# Patient Record
Sex: FEMALE | Race: OTHER
[De-identification: names, ages, dates, MRNs, and addresses within clinical notes are randomized per-mention and may not be internally consistent; named-entity substitution may affect disease eponyms.]

---

## 2018-01-25 ENCOUNTER — HOSPITAL ENCOUNTER (EMERGENCY)
Dept: HOSPITAL 8 - ED | Age: 18
Discharge: HOME | End: 2018-01-25
Payer: MEDICAID

## 2018-01-25 VITALS — SYSTOLIC BLOOD PRESSURE: 130 MMHG | DIASTOLIC BLOOD PRESSURE: 76 MMHG

## 2018-01-25 VITALS — BODY MASS INDEX: 42.24 KG/M2 | HEIGHT: 65 IN | WEIGHT: 253.53 LBS

## 2018-01-25 DIAGNOSIS — J02.9: Primary | ICD-10-CM

## 2018-01-25 DIAGNOSIS — H65.02: ICD-10-CM

## 2018-01-25 PROCEDURE — 99283 EMERGENCY DEPT VISIT LOW MDM: CPT

## 2018-07-04 ENCOUNTER — HOSPITAL ENCOUNTER (EMERGENCY)
Dept: HOSPITAL 8 - ED | Age: 18
LOS: 1 days | Discharge: HOME | End: 2018-07-05
Payer: SELF-PAY

## 2018-07-04 VITALS — HEIGHT: 65 IN | BODY MASS INDEX: 45.69 KG/M2 | WEIGHT: 274.26 LBS

## 2018-07-04 VITALS — DIASTOLIC BLOOD PRESSURE: 84 MMHG | SYSTOLIC BLOOD PRESSURE: 141 MMHG

## 2018-07-04 DIAGNOSIS — H66.91: Primary | ICD-10-CM

## 2018-07-04 PROCEDURE — 99283 EMERGENCY DEPT VISIT LOW MDM: CPT

## 2018-08-03 ENCOUNTER — HOSPITAL ENCOUNTER (EMERGENCY)
Dept: HOSPITAL 8 - ED | Age: 18
Discharge: HOME | End: 2018-08-03
Payer: SELF-PAY

## 2018-08-03 VITALS — HEIGHT: 65 IN | WEIGHT: 277.56 LBS | BODY MASS INDEX: 46.24 KG/M2

## 2018-08-03 VITALS — DIASTOLIC BLOOD PRESSURE: 65 MMHG | SYSTOLIC BLOOD PRESSURE: 109 MMHG

## 2018-08-03 DIAGNOSIS — H60.502: Primary | ICD-10-CM

## 2018-08-03 DIAGNOSIS — H92.02: ICD-10-CM

## 2018-08-03 PROCEDURE — 99283 EMERGENCY DEPT VISIT LOW MDM: CPT

## 2018-08-06 ENCOUNTER — HOSPITAL ENCOUNTER (EMERGENCY)
Dept: HOSPITAL 8 - ED | Age: 18
Discharge: HOME | End: 2018-08-06
Payer: SELF-PAY

## 2018-08-06 VITALS — DIASTOLIC BLOOD PRESSURE: 87 MMHG | SYSTOLIC BLOOD PRESSURE: 132 MMHG

## 2018-08-06 VITALS — WEIGHT: 275.8 LBS | HEIGHT: 65 IN | BODY MASS INDEX: 45.95 KG/M2

## 2018-08-06 DIAGNOSIS — H60.12: Primary | ICD-10-CM

## 2018-08-06 PROCEDURE — 99283 EMERGENCY DEPT VISIT LOW MDM: CPT

## 2018-08-08 ENCOUNTER — HOSPITAL ENCOUNTER (EMERGENCY)
Facility: MEDICAL CENTER | Age: 18
End: 2018-08-08
Attending: EMERGENCY MEDICINE

## 2018-08-08 VITALS
SYSTOLIC BLOOD PRESSURE: 129 MMHG | OXYGEN SATURATION: 99 % | HEART RATE: 88 BPM | WEIGHT: 278.22 LBS | DIASTOLIC BLOOD PRESSURE: 75 MMHG | RESPIRATION RATE: 18 BRPM | TEMPERATURE: 97.7 F

## 2018-08-08 DIAGNOSIS — H60.502 ACUTE OTITIS EXTERNA OF LEFT EAR, UNSPECIFIED TYPE: ICD-10-CM

## 2018-08-08 DIAGNOSIS — H66.90 ACUTE OTITIS MEDIA, UNSPECIFIED OTITIS MEDIA TYPE: ICD-10-CM

## 2018-08-08 PROCEDURE — 99283 EMERGENCY DEPT VISIT LOW MDM: CPT

## 2018-08-08 RX ORDER — AMOXICILLIN AND CLAVULANATE POTASSIUM 875; 125 MG/1; MG/1
1 TABLET, FILM COATED ORAL 2 TIMES DAILY
Qty: 14 TAB | Refills: 0 | Status: SHIPPED | OUTPATIENT
Start: 2018-08-08 | End: 2018-08-15

## 2018-08-08 NOTE — ED NOTES
VSS.  Discharge instructions and prescriptions x1 given- verbalizes understanding.   Ambulatory to lobby with steady gait.

## 2018-08-08 NOTE — ED NOTES
Ambulatory to room with same report as triage note, chart up for ERP.  No acute distress noted.  Call light in reach.

## 2018-08-08 NOTE — ED PROVIDER NOTES
ED Provider Note    CHIEF COMPLAINT  Chief Complaint   Patient presents with   • Ear Pain     left        HPI  Yamini Carmona is a 17 y.o. female who presents complaining ongoing pain in her left ear.  The patient had this for about 3 weeks.  She went to [facility] and was given some Cipro HC otic suspension that she has been using for the last week.  However ear is still hurting.  It is now worse in terms of pain, hurting around the ear as well as inside the ear as well.  She denies any fever.  She has never had this before.  She does not believe she is pregnant.  No diabetes.  There is no other complaint.    PAST MEDICAL HISTORY  None    FAMILY HISTORY  History reviewed. No pertinent family history.    SOCIAL HISTORY  Social History   Substance Use Topics   • Smoking status: Never Smoker   • Smokeless tobacco: Never Used   • Alcohol use No         SURGICAL HISTORY  History reviewed. No pertinent surgical history.    CURRENT MEDICATIONS  Home Medications     Reviewed by Selena Bonilla R.N. (Registered Nurse) on 08/08/18 at 1223  Med List Status: Complete   Medication Last Dose Status        Patient Abraham Taking any Medications                       I have reviewed the nurses notes and/or the list brought with the patient.    ALLERGIES  No Known Allergies    REVIEW OF SYSTEMS  See HPI for further details. Review of systems as above, otherwise all other systems are negative.     PHYSICAL EXAM  VITAL SIGNS: /75   Pulse 88   Temp 36.5 °C (97.7 °F)   Resp 18   Wt (!) 126.2 kg (278 lb 3.5 oz)   SpO2 99%     Constitutional: Well appearing patient in no acute distress.  Not toxic, nor ill in appearance.  HENT: Mucus membranes moist.  Oropharynx is clear.  Left ear is notable for tenderness over the tragus and the pinna.  She also has some mild tenderness to percussion over the mastoid process.  External canal is edematous, and somewhat erythematous.  I can however see the tympanic membrane which  is also erythematous.  There is no loss of landmarks however.  On the right, she has some scarring over the TM.  Eyes: Pupils equally round.  No scleral icterus.   Neck: Full nontender range of motion.  Lymphatic: No cervical or periauricular lymphadenopathy noted.   Cardiovascular: Regular heart rate and rhythm.  No murmurs, rubs, nor gallop appreciated.   Thorax & Lungs: Chest is nontender.  Lungs are clear to auscultation with good air movement bilaterally.  No wheeze, rhonchi, nor rales.   Abdomen: Soft, with no tenderness, rebound nor guarding.  No mass, pulsatile mass, nor hepatosplenomegaly appreciated.  Skin: No purpura nor petechia noted.  Extremities/Musculoskeletal: No sign of trauma.   Neurologic: Alert & oriented.  Strength and sensation is intact all around.  Gait is normal.  Psychiatric: Normal affect appropriate for the clinical situation.    MEDICAL RECORD  I have reviewed patient's medical record and pertinent results are listed above.    COURSE & MEDICAL DECISION MAKING  I have reviewed any medical record information, laboratory studies and radiographic results as noted above.  This is a well-appearing, well-hydrated, afebrile patient with no history of diabetes who presents with what appears to be a combination of otitis externa and likely some otitis media as well.  With tenderness over the mastoid, I am worried about the possibility of some concomitant mastoiditis as well.  I would like her to continue with another week's worth of the Cipro HC otic.  She had previously used a wick, at this point the canal is improved enough that I do not think that she needs to use awake.  I will go ahead and start her on Augmentin as well to cover for the possibility of airspace disease specifically the middle ear or the mastoid.  I have asked her to call to make an appointment with Dr. Gallego in ENT, she should follow-up if she is not back to normal in a week's time.  Instructions on both otitis externa and  media.    FINAL IMPRESSION  1. Acute otitis externa of left ear, unspecified type    2. Acute otitis media, unspecified otitis media type           This dictation was created using voice recognition software.    Electronically signed by: Titus Dyson, 8/8/2018 2:34 PM

## 2018-08-08 NOTE — ED TRIAGE NOTES
Left ear pain, started 3 weeks ago, seen at [facility] and given abx.  NO relief, still swollen on the left side of her ear.  No noted fevers.

## 2018-08-08 NOTE — DISCHARGE INSTRUCTIONS
"Continue with those ear drops, 3-4 drops 2x/day for 7 more days.  Add Augmentin.  I recommend calling today to make follow up appt for next week with ENT.  Return to ER for any worsening at all.    Otitis Externa  Otitis externa is a germ infection in the outer ear. The outer ear is the area from the eardrum to the outside of the ear. Otitis externa is sometimes called \"swimmer's ear.\"  HOME CARE  · Put drops in the ear as told by your doctor.  · Only take medicine as told by your doctor.  · If you have diabetes, your doctor may give you more directions. Follow your doctor's directions.  · Keep all doctor visits as told.  To avoid another infection:  · Keep your ear dry. Use the corner of a towel to dry your ear after swimming or bathing.  · Avoid scratching or putting things inside your ear.  · Avoid swimming in lakes, dirty water, or pools that use a chemical called chlorine poorly.  · You may use ear drops after swimming. Combine equal amounts of white vinegar and alcohol in a bottle. Put 3 or 4 drops in each ear.  GET HELP IF:   · You have a fever.  · Your ear is still red, puffy (swollen), or painful after 3 days.  · You still have yellowish-white fluid (pus) coming from the ear after 3 days.  · Your redness, puffiness, or pain gets worse.  · You have a really bad headache.  · You have redness, puffiness, pain, or tenderness behind your ear.  MAKE SURE YOU:   · Understand these instructions.  · Will watch your condition.  · Will get help right away if you are not doing well or get worse.  This information is not intended to replace advice given to you by your health care provider. Make sure you discuss any questions you have with your health care provider.  Document Released: 06/05/2009 Document Revised: 01/08/2016 Document Reviewed: 09/26/2016  [employer] Interactive Patient Education © 2017 [employer]    Otitis Media, Adult  Otitis media is redness, soreness, and puffiness (swelling) in the space just behind " your eardrum (middle ear). It may be caused by allergies or infection. It often happens along with a cold.  Follow these instructions at home:  · Take your medicine as told. Finish it even if you start to feel better.  · Only take over-the-counter or prescription medicines for pain, discomfort, or fever as told by your doctor.  · Follow up with your doctor as told.  Contact a doctor if:  · You have otitis media only in one ear, or bleeding from your nose, or both.  · You notice a lump on your neck.  · You are not getting better in 3-5 days.  · You feel worse instead of better.  Get help right away if:  · You have pain that is not helped with medicine.  · You have puffiness, redness, or pain around your ear.  · You get a stiff neck.  · You cannot move part of your face (paralysis).  · You notice that the bone behind your ear hurts when you touch it.  This information is not intended to replace advice given to you by your health care provider. Make sure you discuss any questions you have with your health care provider.  Document Released: 06/05/2009 Document Revised: 05/25/2017 Document Reviewed: 07/15/2014  [employer] Interactive Patient Education © 2017 [employer]

## 2019-07-22 ENCOUNTER — APPOINTMENT (OUTPATIENT)
Dept: RADIOLOGY | Facility: MEDICAL CENTER | Age: 19
End: 2019-07-22
Attending: EMERGENCY MEDICINE
Payer: COMMERCIAL

## 2019-07-22 ENCOUNTER — HOSPITAL ENCOUNTER (EMERGENCY)
Facility: MEDICAL CENTER | Age: 19
End: 2019-07-23
Attending: EMERGENCY MEDICINE
Payer: COMMERCIAL

## 2019-07-22 DIAGNOSIS — R10.30 LOWER ABDOMINAL PAIN: ICD-10-CM

## 2019-07-22 DIAGNOSIS — D64.9 ANEMIA, UNSPECIFIED TYPE: ICD-10-CM

## 2019-07-22 LAB
BASOPHILS # BLD AUTO: 0.4 % (ref 0–1.8)
BASOPHILS # BLD: 0.05 K/UL (ref 0–0.12)
EOSINOPHIL # BLD AUTO: 0.06 K/UL (ref 0–0.51)
EOSINOPHIL NFR BLD: 0.5 % (ref 0–6.9)
ERYTHROCYTE [DISTWIDTH] IN BLOOD BY AUTOMATED COUNT: 48 FL (ref 35.9–50)
HCT VFR BLD AUTO: 34.8 % (ref 37–47)
HGB BLD-MCNC: 10.6 G/DL (ref 12–16)
IMM GRANULOCYTES # BLD AUTO: 0.04 K/UL (ref 0–0.11)
IMM GRANULOCYTES NFR BLD AUTO: 0.3 % (ref 0–0.9)
LYMPHOCYTES # BLD AUTO: 4.69 K/UL (ref 1–4.8)
LYMPHOCYTES NFR BLD: 37.7 % (ref 22–41)
MCH RBC QN AUTO: 22.7 PG (ref 27–33)
MCHC RBC AUTO-ENTMCNC: 30.5 G/DL (ref 33.6–35)
MCV RBC AUTO: 74.7 FL (ref 81.4–97.8)
MONOCYTES # BLD AUTO: 0.81 K/UL (ref 0–0.85)
MONOCYTES NFR BLD AUTO: 6.5 % (ref 0–13.4)
NEUTROPHILS # BLD AUTO: 6.8 K/UL (ref 2–7.15)
NEUTROPHILS NFR BLD: 54.6 % (ref 44–72)
NRBC # BLD AUTO: 0 K/UL
NRBC BLD-RTO: 0 /100 WBC
PLATELET # BLD AUTO: 408 K/UL (ref 164–446)
PMV BLD AUTO: 10.3 FL (ref 9–12.9)
RBC # BLD AUTO: 4.66 M/UL (ref 4.2–5.4)
WBC # BLD AUTO: 12.5 K/UL (ref 4.8–10.8)

## 2019-07-22 PROCEDURE — 85025 COMPLETE CBC W/AUTO DIFF WBC: CPT

## 2019-07-22 PROCEDURE — 99284 EMERGENCY DEPT VISIT MOD MDM: CPT

## 2019-07-22 PROCEDURE — 84702 CHORIONIC GONADOTROPIN TEST: CPT

## 2019-07-22 PROCEDURE — 80053 COMPREHEN METABOLIC PANEL: CPT

## 2019-07-22 PROCEDURE — 83690 ASSAY OF LIPASE: CPT

## 2019-07-23 ENCOUNTER — APPOINTMENT (OUTPATIENT)
Dept: RADIOLOGY | Facility: MEDICAL CENTER | Age: 19
End: 2019-07-23
Attending: EMERGENCY MEDICINE
Payer: COMMERCIAL

## 2019-07-23 ENCOUNTER — GYNECOLOGY VISIT (OUTPATIENT)
Dept: OBGYN | Facility: CLINIC | Age: 19
End: 2019-07-23
Payer: COMMERCIAL

## 2019-07-23 VITALS
DIASTOLIC BLOOD PRESSURE: 57 MMHG | HEIGHT: 65 IN | RESPIRATION RATE: 16 BRPM | OXYGEN SATURATION: 99 % | BODY MASS INDEX: 44.3 KG/M2 | WEIGHT: 265.88 LBS | HEART RATE: 96 BPM | TEMPERATURE: 97.5 F | SYSTOLIC BLOOD PRESSURE: 126 MMHG

## 2019-07-23 VITALS
BODY MASS INDEX: 44.48 KG/M2 | SYSTOLIC BLOOD PRESSURE: 112 MMHG | WEIGHT: 267 LBS | HEIGHT: 65 IN | DIASTOLIC BLOOD PRESSURE: 74 MMHG

## 2019-07-23 DIAGNOSIS — Z34.81 ENCOUNTER FOR SUPERVISION OF OTHER NORMAL PREGNANCY IN FIRST TRIMESTER: ICD-10-CM

## 2019-07-23 DIAGNOSIS — R10.9 ABDOMINAL CRAMPING: ICD-10-CM

## 2019-07-23 LAB
ALBUMIN SERPL BCP-MCNC: 4.1 G/DL (ref 3.2–4.9)
ALBUMIN/GLOB SERPL: 1.2 G/DL
ALP SERPL-CCNC: 76 U/L (ref 30–99)
ALT SERPL-CCNC: 15 U/L (ref 2–50)
ANION GAP SERPL CALC-SCNC: 12 MMOL/L (ref 0–11.9)
APPEARANCE UR: CLEAR
AST SERPL-CCNC: 18 U/L (ref 12–45)
B-HCG SERPL-ACNC: ABNORMAL MIU/ML (ref 0–5)
BACTERIA #/AREA URNS HPF: NEGATIVE /HPF
BILIRUB SERPL-MCNC: 0.5 MG/DL (ref 0.1–1.5)
BILIRUB UR QL STRIP.AUTO: NEGATIVE
BUN SERPL-MCNC: 9 MG/DL (ref 8–22)
CALCIUM SERPL-MCNC: 9.7 MG/DL (ref 8.5–10.5)
CHLORIDE SERPL-SCNC: 106 MMOL/L (ref 96–112)
CO2 SERPL-SCNC: 20 MMOL/L (ref 20–33)
COLOR UR: YELLOW
CREAT SERPL-MCNC: 0.83 MG/DL (ref 0.5–1.4)
EPI CELLS #/AREA URNS HPF: NEGATIVE /HPF
GLOBULIN SER CALC-MCNC: 3.3 G/DL (ref 1.9–3.5)
GLUCOSE SERPL-MCNC: 83 MG/DL (ref 65–99)
GLUCOSE UR STRIP.AUTO-MCNC: NEGATIVE MG/DL
HYALINE CASTS #/AREA URNS LPF: NORMAL /LPF
KETONES UR STRIP.AUTO-MCNC: NEGATIVE MG/DL
LEUKOCYTE ESTERASE UR QL STRIP.AUTO: ABNORMAL
LIPASE SERPL-CCNC: 9 U/L (ref 11–82)
MICRO URNS: ABNORMAL
NITRITE UR QL STRIP.AUTO: NEGATIVE
PH UR STRIP.AUTO: 5.5 [PH]
POTASSIUM SERPL-SCNC: 3.8 MMOL/L (ref 3.6–5.5)
PROT SERPL-MCNC: 7.4 G/DL (ref 6–8.2)
PROT UR QL STRIP: NEGATIVE MG/DL
RBC # URNS HPF: NORMAL /HPF
RBC UR QL AUTO: NEGATIVE
SODIUM SERPL-SCNC: 138 MMOL/L (ref 135–145)
SP GR UR STRIP.AUTO: 1.01
UROBILINOGEN UR STRIP.AUTO-MCNC: 0.2 MG/DL
WBC #/AREA URNS HPF: NORMAL /HPF

## 2019-07-23 PROCEDURE — 81001 URINALYSIS AUTO W/SCOPE: CPT

## 2019-07-23 PROCEDURE — 76817 TRANSVAGINAL US OBSTETRIC: CPT

## 2019-07-23 PROCEDURE — 99202 OFFICE O/P NEW SF 15 MIN: CPT | Performed by: OBSTETRICS & GYNECOLOGY

## 2019-07-23 NOTE — ED PROVIDER NOTES
"ED Provider Note    Scribed for Tika Urrutia D.O. by Breana Turner. 2019, 11:27 PM.    Primary care provider: No primary care provider  Means of arrival: walkin  History obtained from: Patient  History limited by: none    CHIEF COMPLAINT  Chief Complaint   Patient presents with   • Abdominal Pain     x2 hrs, 9/10 generalized pain   • Pregnancy     8 wks       HPI  Yamini Carmona is a 18 y.o. female who presents to the Emergency Department for diffuse intermittent abdominal pain onset this morning. This is exacerbated with sitting and laying down and described as a \"tight pressure\". She is currently 8 weeks pregnant. . No complications with previous pregnancy. Has not seen her OBGYN yet for this current pregnancy, but has an appointment August 15. Denies vaginal bleeding, vaginal discharge, nausea, vomiting, cough, wheezing, shortness of breath, or chest pain. History of anemia. No daily medication aside from prenatals.    REVIEW OF SYSTEMS  See HPI for further details.   All other systems are negative.     PAST MEDICAL HISTORY  History reviewed. No pertinent medical history.    SURGICAL HISTORY  patient denies any surgical history    SOCIAL HISTORY  Social History   Substance Use Topics   • Smoking status: Never Smoker   • Smokeless tobacco: Never Used   • Alcohol use No      History   Drug Use No       FAMILY HISTORY  History reviewed. No pertinent family history.    CURRENT MEDICATIONS  Reviewed.  See Encounter Summary.     ALLERGIES  No Known Allergies    PHYSICAL EXAM  VITAL SIGNS: /80   Pulse (!) 107   Temp 36.4 °C (97.5 °F) (Temporal)   Resp 18   Ht 1.651 m (5' 5\")   Wt 120.6 kg (265 lb 14 oz)   LMP 2019 (Exact Date)   SpO2 99%   BMI 44.24 kg/m²   Constitutional: Alert and in no apparent distress. Morbidly obese.  HENT: Normocephalic atraumatic. Bilateral external ears normal. Nose normal. Mucous membranes are moist.  Eyes: Pupils are equal and reactive. Conjunctiva normal. " Non-icteric sclera.   Neck: Normal range of motion without tenderness. Supple. No meningeal signs.  Cardiovascular: Tachycardic rate and regular rhythm. No murmurs, gallops or rubs.  Thorax & Lungs: Breath sounds are clear to auscultation bilaterally. No wheezing, rhonchi or rales.  Abdomen: Morbidly obese. Soft, nontender and nondistended. No peritoneal signs noted.  Skin: Warm and dry. No rashes are noted.  Extremities: 2+ peripheral pulses. Cap refill is less than 2 seconds. No edema, cyanosis, or clubbing.  Musculoskeletal: Good range of motion in all major joints. No tenderness to palpation or major deformities noted.   Neurologic: Alert and oriented ×3. The patient moves all 4 extremities and follows commands.  Psychiatric: Affect is normal. Judgment appears to be intact.    DIAGNOSTIC STUDIES / PROCEDURES   LABS  Results for orders placed or performed during the hospital encounter of 07/22/19   CBC WITH DIFFERENTIAL   Result Value Ref Range    WBC 12.5 (H) 4.8 - 10.8 K/uL    RBC 4.66 4.20 - 5.40 M/uL    Hemoglobin 10.6 (L) 12.0 - 16.0 g/dL    Hematocrit 34.8 (L) 37.0 - 47.0 %    MCV 74.7 (L) 81.4 - 97.8 fL    MCH 22.7 (L) 27.0 - 33.0 pg    MCHC 30.5 (L) 33.6 - 35.0 g/dL    RDW 48.0 35.9 - 50.0 fL    Platelet Count 408 164 - 446 K/uL    MPV 10.3 9.0 - 12.9 fL    Neutrophils-Polys 54.60 44.00 - 72.00 %    Lymphocytes 37.70 22.00 - 41.00 %    Monocytes 6.50 0.00 - 13.40 %    Eosinophils 0.50 0.00 - 6.90 %    Basophils 0.40 0.00 - 1.80 %    Immature Granulocytes 0.30 0.00 - 0.90 %    Nucleated RBC 0.00 /100 WBC    Neutrophils (Absolute) 6.80 2.00 - 7.15 K/uL    Lymphs (Absolute) 4.69 1.00 - 4.80 K/uL    Monos (Absolute) 0.81 0.00 - 0.85 K/uL    Eos (Absolute) 0.06 0.00 - 0.51 K/uL    Baso (Absolute) 0.05 0.00 - 0.12 K/uL    Immature Granulocytes (abs) 0.04 0.00 - 0.11 K/uL    NRBC (Absolute) 0.00 K/uL   COMP METABOLIC PANEL   Result Value Ref Range    Sodium 138 135 - 145 mmol/L    Potassium 3.8 3.6 - 5.5 mmol/L     Chloride 106 96 - 112 mmol/L    Co2 20 20 - 33 mmol/L    Anion Gap 12.0 (H) 0.0 - 11.9    Glucose 83 65 - 99 mg/dL    Bun 9 8 - 22 mg/dL    Creatinine 0.83 0.50 - 1.40 mg/dL    Calcium 9.7 8.5 - 10.5 mg/dL    AST(SGOT) 18 12 - 45 U/L    ALT(SGPT) 15 2 - 50 U/L    Alkaline Phosphatase 76 30 - 99 U/L    Total Bilirubin 0.5 0.1 - 1.5 mg/dL    Albumin 4.1 3.2 - 4.9 g/dL    Total Protein 7.4 6.0 - 8.2 g/dL    Globulin 3.3 1.9 - 3.5 g/dL    A-G Ratio 1.2 g/dL   LIPASE   Result Value Ref Range    Lipase 9 (L) 11 - 82 U/L   HCG QUANTITATIVE   Result Value Ref Range    Bhcg 90884.2 (H) 0.0 - 5.0 mIU/mL   URINALYSIS,CULTURE IF INDICATED   Result Value Ref Range    Color Yellow     Character Clear     Specific Gravity 1.010 <1.035    Ph 5.5 5.0 - 8.0    Glucose Negative Negative mg/dL    Ketones Negative Negative mg/dL    Protein Negative Negative mg/dL    Bilirubin Negative Negative    Urobilinogen, Urine 0.2 Negative    Nitrite Negative Negative    Leukocyte Esterase Trace (A) Negative    Occult Blood Negative Negative    Micro Urine Req Microscopic    ESTIMATED GFR   Result Value Ref Range    GFR If African American >60 >60 mL/min/1.73 m 2    GFR If Non African American >60 >60 mL/min/1.73 m 2   URINE MICROSCOPIC (W/UA)   Result Value Ref Range    WBC 0-2 /hpf    RBC 0-2 /hpf    Bacteria Negative None /hpf    Epithelial Cells Negative /hpf    Hyaline Cast 0-2 /lpf   All labs were reviewed by me.    RADIOLOGY  US-OB TRANSVAGINAL ONLY   Final Result         1.  Viable single intrauterine gestation of an estimated gestation of 7 weeks 0 days for estimated date of delivery March 10, 2020.   2.  Heterogeneous lesion in the right ovary, appearance suggests corpus luteal cyst, otherwise indeterminate.   3.  Nabothian cysts      The radiologist's interpretation of all radiological studies have been reviewed by me.    COURSE & MEDICAL DECISION MAKING  Pertinent Labs & Imaging studies reviewed. (See chart for details)    11:27 PM  - Patient seen and examined at bedside. Ordered transvaginal US, cbc with differential, comp metabolic panel, and other labs to evaluate her symptoms.     Decision Making:  This is a  18 y.o. year old female who presents with abdominal pain. On initial evaluation, the patient appeared well and in no acute distress. Her physical exam was benign. She denied vaginal bleeding, and pelvic exam was deferred at this time.  Beta-hCG was elevated at 65,000.  Urinalysis did not have any bacteria or evidence of infection.  H&H were 10.6 and 34.8 respectively.  Patient does have a history of anemia but no previous values are available in her chart for comparison.  She has not had any lightheadedness or syncope and I have low clinical suspicion for significant change at this time.  Ultrasound revealed a single IUP of estimated gestation 7 weeks and 0 days.  No adnexal masses were noted on ultrasound concerning for ectopic pregnancy.  Repeat vital signs were normal and upon reevaluation, the patient was comfortable with no symptoms.  I do believe she is stable for discharge but encouraged her to call the pregnancy center in the morning to set up a follow-up appointment sooner than August 15.  Ideally, like her to be seen in the next 48 hours.  She is agreeable with this plan and will follow-up.  She will return to the ED with any worsening signs or symptoms.    The patient presents with abdominal pain without signs of peritonitis or other life-threatening or serious etiology. The patient appears stable for discharge and has been instructed to return immediately if the symptoms worsen in any way, or in 8-12hr if not improved for re-evaluation. The patient has been instructed to return if the symptoms worsen or change in any way.    DISPOSITION:  Patient will be discharged home in stable condition.    FOLLOW UP:  [facility]  [address] [place] [id number] [phone]  Call in 1 day  To schedule a  follow up appointment    [facility], Emergency Dept  [address] [facility] [id number] [phone]  Go to   As needed if you have worsening pain, pass out, or have vaginal bleeding      OUTPATIENT MEDICATIONS:  New Prescriptions    No medications on file     FINAL IMPRESSION  1. Lower abdominal pain    2. Anemia, unspecified type         I, Breana Turner (Poppy), am scribing for, and in the presence of, Tika Urrutia D.O.    Electronically signed by: Breana Turner (Poppy), 7/22/2019    I, Tika Urrutia D.O. personally performed the services described in this documentation, as scribed by Breana Turner in my presence, and it is both accurate and complete.    The note accurately reflects work and decisions made by me.  Tika Urrutia  7/23/2019  1:52 AM    C

## 2019-07-23 NOTE — PROGRESS NOTES
GYN Visit    CC: ED follow-up    HPI: Yamini Carmona is a 18 y.o.  @ 7w1d by 7wk ultrasound yesterday in the emergency room here for ED follow-up.  Patient reports she went to the emergency room for lower abdominal cramping.  Denies any vaginal bleeding in this pregnancy.  Reports that she was not trying to get pregnant, although is happy with the idea of being pregnant.  Is taking a prenatal vitamin at this time.  Plans to follow-up with us for OB care this pregnancy.    Denies pain/cramping.  Denies vaginal bleeding.    ROS:  constitutional: denies fevers, general concerns  CV: denies chest pain, palpitations, edema  Resp: denies shortness of breath, cough  GI: denies abd pain, N/V, diarrhea/constipation, blood in stool  : denies irregular vaginal bleeding, discharge, pain, denies urinary complaints  Eyes: Denies double vision/vision change  ENT: Denies sore throat/ear problems/concerns    OB History    Para Term  AB Living   2 1 1     1   SAB TAB Ectopic Molar Multiple Live Births             1      # Outcome Date GA Lbr Colby/2nd Weight Sex Delivery Anes PTL Lv   2 Current            1 Term 08/10/17 40w0d  3.771 kg (8 lb 5 oz) F Vag-Spont   HARITHA      Denies comp occasions with prior pregnancy    GYN Hx:   Reports monthly menses  Denies hx of STIs      PMHx: denies  PSHx: denies    Medications:   Current Outpatient Prescriptions Ordered in [facility]   Medication Sig Dispense Refill   • Prenatal Multivit-Min-Fe-FA (PRE- FORMULA) Tab Take  by mouth.       No current Epic-ordered facility-administered medications on file.        Allergies: Patient has no known allergies.    SocHx: denies tob/drugs/EtOH    Family History   Problem Relation Age of Onset   • Cancer Maternal Uncle         colon   • Diabetes Maternal Grandmother    • Diabetes Maternal Grandfather    • Heart Disease Other      Denies hx of GYN/breast cancers    Physical Exam:  /74 (BP Location: Left arm, Patient Position:  "Sitting)   Ht 1.651 m (5' 5\")   Wt 121.1 kg (267 lb)   LMP 2019 (Exact Date)   BMI 44.43 kg/m²   gen: AAO, NAD, affect appropriate  CV: RRR; no LE edema  resp: ctab  abd: soft, NT, ND, no masses  Skin: warm/dry, no lesions    Pelvic US: 19  FINDINGS:    Nabothian cysts are seen.    Single intrauterine gestation is identified with a heart rate of 143.  No korey-gestational hemorrhage is seen.    Gestational sac shape is within normal limits. Crown rump length is 0.95 cm, consistent with a sonographic age of 7 weeks, 0 days. This corresponds to an estimated date of delivery of 3/0/2020.    The left ovary measures 2.8 x 1.9 x 2.0 cm. Doppler flow in the left ovary is seen. The right ovary measures 3.6 x 3.1 x 3.4 cm. There is heterogeneous lesion in the right ovary is seen measuring 2.5 x 2.3 x 2.6 cm. Doppler flow in the right ovary is   observed.    Trace free fluid in the cul-de-sac is noted.   Impression         1.  Viable single intrauterine gestation of an estimated gestation of 7 weeks 0 days for estimated date of delivery March 10, 2020.  2.  Heterogeneous lesion in the right ovary, appearance suggests corpus luteal cyst, otherwise indeterminate.  3.  Nabothian cysts         A/P: 18 y.o.  @ 7w1d by 7wk US here for ED f/u  1. Abdominal cramping     2. Encounter for supervision of other normal pregnancy in first trimester     No pain or vaginal bleeding.  Discussed ultrasound findings and ABEL of March 10, 2020.  Gestational age today 7 weeks 1 day (greater than 5 days from LMP dating, will date by ultrasound this pregnancy)    Discussed vaginal bleeding/SAB precautions    To continue prenatal vitamin.    Keep new OB appointment as scheduled 8/15/2019      Brynn Chang MD  [facility], Women's Health        "

## 2019-07-23 NOTE — NON-PROVIDER
Patient her for ER f/u.  C/o some cramping on and off, denies any vaginal bleeding  Has ER US yesterday.  LMP=5/29/19  Phone number:[phone]

## 2019-07-23 NOTE — ED TRIAGE NOTES
"Chief Complaint   Patient presents with   • Abdominal Pain     x2 hrs, 9/10 generalized pain   • Pregnancy     8 wks     /80   Pulse (!) 107   Temp 36.4 °C (97.5 °F) (Temporal)   Resp 18   Ht 1.651 m (5' 5\")   Wt 120.6 kg (265 lb 14 oz)   SpO2 99%     Pt ambulates with a steady gait to triage with above complaints. Pt denies vaginal bleeding or discharge. Denies fever, chills, N/V.  "

## 2019-08-15 ENCOUNTER — HOSPITAL ENCOUNTER (OUTPATIENT)
Facility: MEDICAL CENTER | Age: 19
End: 2019-08-15
Attending: NURSE PRACTITIONER
Payer: COMMERCIAL

## 2019-08-15 ENCOUNTER — HOSPITAL ENCOUNTER (OUTPATIENT)
Dept: LAB | Facility: MEDICAL CENTER | Age: 19
End: 2019-08-15
Attending: NURSE PRACTITIONER
Payer: COMMERCIAL

## 2019-08-15 ENCOUNTER — INITIAL PRENATAL (OUTPATIENT)
Dept: OBGYN | Facility: CLINIC | Age: 19
End: 2019-08-15
Payer: COMMERCIAL

## 2019-08-15 VITALS
SYSTOLIC BLOOD PRESSURE: 112 MMHG | DIASTOLIC BLOOD PRESSURE: 66 MMHG | HEIGHT: 64 IN | BODY MASS INDEX: 44.73 KG/M2 | WEIGHT: 262 LBS

## 2019-08-15 DIAGNOSIS — Z34.81 ENCOUNTER FOR SUPERVISION OF OTHER NORMAL PREGNANCY IN FIRST TRIMESTER: Primary | ICD-10-CM

## 2019-08-15 DIAGNOSIS — Z34.81 ENCOUNTER FOR SUPERVISION OF OTHER NORMAL PREGNANCY IN FIRST TRIMESTER: ICD-10-CM

## 2019-08-15 LAB
ABO GROUP BLD: NORMAL
APPEARANCE UR: CLEAR
APPEARANCE UR: CLEAR
BACTERIA #/AREA URNS HPF: ABNORMAL /HPF
BASOPHILS # BLD AUTO: 0.3 % (ref 0–1.8)
BASOPHILS # BLD: 0.03 K/UL (ref 0–0.12)
BILIRUB UR QL STRIP.AUTO: NEGATIVE
BILIRUB UR STRIP-MCNC: NORMAL MG/DL
BLD GP AB SCN SERPL QL: NORMAL
COLOR UR AUTO: YELLOW
COLOR UR: ABNORMAL
EOSINOPHIL # BLD AUTO: 0.04 K/UL (ref 0–0.51)
EOSINOPHIL NFR BLD: 0.4 % (ref 0–6.9)
EPI CELLS #/AREA URNS HPF: ABNORMAL /HPF
ERYTHROCYTE [DISTWIDTH] IN BLOOD BY AUTOMATED COUNT: 53 FL (ref 35.9–50)
GLUCOSE UR STRIP.AUTO-MCNC: NEGATIVE MG/DL
GLUCOSE UR STRIP.AUTO-MCNC: NEGATIVE MG/DL
HBV SURFACE AG SER QL: NEGATIVE
HCT VFR BLD AUTO: 37.7 % (ref 37–47)
HGB BLD-MCNC: 11.8 G/DL (ref 12–16)
HIV 1+2 AB+HIV1 P24 AG SERPL QL IA: NON REACTIVE
HYALINE CASTS #/AREA URNS LPF: ABNORMAL /LPF
IMM GRANULOCYTES # BLD AUTO: 0.02 K/UL (ref 0–0.11)
IMM GRANULOCYTES NFR BLD AUTO: 0.2 % (ref 0–0.9)
KETONES UR STRIP.AUTO-MCNC: ABNORMAL MG/DL
KETONES UR STRIP.AUTO-MCNC: NEGATIVE MG/DL
LEUKOCYTE ESTERASE UR QL STRIP.AUTO: ABNORMAL
LEUKOCYTE ESTERASE UR QL STRIP.AUTO: NORMAL
LYMPHOCYTES # BLD AUTO: 3.04 K/UL (ref 1–4.8)
LYMPHOCYTES NFR BLD: 30.1 % (ref 22–41)
MCH RBC QN AUTO: 23.6 PG (ref 27–33)
MCHC RBC AUTO-ENTMCNC: 31.3 G/DL (ref 33.6–35)
MCV RBC AUTO: 75.2 FL (ref 81.4–97.8)
MICRO URNS: ABNORMAL
MONOCYTES # BLD AUTO: 0.61 K/UL (ref 0–0.85)
MONOCYTES NFR BLD AUTO: 6 % (ref 0–13.4)
MUCOUS THREADS #/AREA URNS HPF: ABNORMAL /HPF
NEUTROPHILS # BLD AUTO: 6.37 K/UL (ref 2–7.15)
NEUTROPHILS NFR BLD: 63 % (ref 44–72)
NITRITE UR QL STRIP.AUTO: NEGATIVE
NITRITE UR QL STRIP.AUTO: NEGATIVE
NRBC # BLD AUTO: 0 K/UL
NRBC BLD-RTO: 0 /100 WBC
PH UR STRIP.AUTO: 6 [PH] (ref 5–8)
PH UR STRIP.AUTO: 6 [PH] (ref 5–8)
PLATELET # BLD AUTO: 389 K/UL (ref 164–446)
PMV BLD AUTO: 11 FL (ref 9–12.9)
PROT UR QL STRIP: 30 MG/DL
PROT UR QL STRIP: NEGATIVE MG/DL
RBC # BLD AUTO: 5.01 M/UL (ref 4.2–5.4)
RBC # URNS HPF: ABNORMAL /HPF
RBC UR QL AUTO: NEGATIVE
RBC UR QL AUTO: NEGATIVE
RH BLD: NORMAL
RUBV AB SER QL: 7.6 IU/ML
SP GR UR STRIP.AUTO: 1.02
SP GR UR STRIP.AUTO: 1.03
TREPONEMA PALLIDUM IGG+IGM AB [PRESENCE] IN SERUM OR PLASMA BY IMMUNOASSAY: NON REACTIVE
UROBILINOGEN UR STRIP-MCNC: NORMAL MG/DL
UROBILINOGEN UR STRIP.AUTO-MCNC: 1 MG/DL
WBC # BLD AUTO: 10.1 K/UL (ref 4.8–10.8)
WBC #/AREA URNS HPF: ABNORMAL /HPF

## 2019-08-15 PROCEDURE — 81002 URINALYSIS NONAUTO W/O SCOPE: CPT | Performed by: NURSE PRACTITIONER

## 2019-08-15 PROCEDURE — 36415 COLL VENOUS BLD VENIPUNCTURE: CPT

## 2019-08-15 PROCEDURE — 86901 BLOOD TYPING SEROLOGIC RH(D): CPT

## 2019-08-15 PROCEDURE — 86780 TREPONEMA PALLIDUM: CPT

## 2019-08-15 PROCEDURE — 87340 HEPATITIS B SURFACE AG IA: CPT

## 2019-08-15 PROCEDURE — 86850 RBC ANTIBODY SCREEN: CPT

## 2019-08-15 PROCEDURE — 87389 HIV-1 AG W/HIV-1&-2 AB AG IA: CPT

## 2019-08-15 PROCEDURE — 85025 COMPLETE CBC W/AUTO DIFF WBC: CPT

## 2019-08-15 PROCEDURE — 0500F INITIAL PRENATAL CARE VISIT: CPT | Performed by: NURSE PRACTITIONER

## 2019-08-15 PROCEDURE — 88175 CYTOPATH C/V AUTO FLUID REDO: CPT

## 2019-08-15 PROCEDURE — 87491 CHLMYD TRACH DNA AMP PROBE: CPT

## 2019-08-15 PROCEDURE — 81001 URINALYSIS AUTO W/SCOPE: CPT

## 2019-08-15 PROCEDURE — 86900 BLOOD TYPING SEROLOGIC ABO: CPT

## 2019-08-15 PROCEDURE — 87591 N.GONORRHOEAE DNA AMP PROB: CPT

## 2019-08-15 PROCEDURE — 86762 RUBELLA ANTIBODY: CPT

## 2019-08-15 ASSESSMENT — ENCOUNTER SYMPTOMS
NEUROLOGICAL NEGATIVE: 1
EYES NEGATIVE: 1
PSYCHIATRIC NEGATIVE: 1
GASTROINTESTINAL NEGATIVE: 1
CARDIOVASCULAR NEGATIVE: 1
MUSCULOSKELETAL NEGATIVE: 1
RESPIRATORY NEGATIVE: 1
CONSTITUTIONAL NEGATIVE: 1

## 2019-08-15 NOTE — PROGRESS NOTES
Pt. Here for NOB visit today.  # [phone]  First prenatal care  Pt. States she is having slight cramping   Pharmacy verified  Pt would like AFP  Pt declines CF, consent signed.  Chaperone offered and not indicated

## 2019-08-15 NOTE — LETTER
Cystic Fibrosis Carrier Testing  Yamini Carmona    The following information is about a blood test that can be done to determine if you and/or your partner carry the gene for cystic fibrosis.    WHAT IS CYSTIC FIBROSIS?  · Cystic fibrosis (CF) is an inherited disease that affects more than 25,000 American children and young adults.  · Symptoms of CF vary but include lung congestion, pneumonia, diarrhea and poor growth.  Most people with CF have severe medical problems and some die at a young age.  Others have so few symptoms they are unaware they have CF.  · CF does not affect intelligence.  · Although there is no cure for CF at this time, scientists are making progress in improving treatment and in searching for a cure.  In the past many people with CF  at a very young age.  Today, many are living into their 20’s and 30’s.    IS THERE A CHANCE MY BABY COULD HAVE CYSTIC FIBROSIS?  · You can have a child with CF even if there is no history in your family (see chart below).  · CF testing can help determine if you are a carrier and at risk to have a child with CF.  Note: if both parents are carriers, there is a 1 in 4 (25%) chance with each pregnancy that they will have a child with CF.  · Carriers have one normal CF gene and one altered CF gene.  · People with CF have two altered CF genes.  · Most people have two normal copies of the CF gene.    Approximate risk that a couple with no family history of cystic fibrosis will have a child with cystic fibrosis:    Ethnic background / Risk     couple:  1 in 2,500   couple:  1 in 15,000            couple:  1 in 8,000     American couple:  1 in 32,000     WHAT TESTING IS AVAILABLE?  · There is a blood test that can be done to find out if you or your partner is a carrier.  · It is important to understand that CF carrier testing does not detect all CF carriers.  · If the test shows that you are both CF carriers, you unborn baby can be  tested to find out if the baby has CF.    HOW MUCH DOES IT COST TO HAVE CYSTIC FIBROSIS CARRIER TESTING?  · Cost and insurance coverage for CF carrier testing vary depending upon the laboratory used and your insurance policy.  · The average cost for CF carrier testing is $300 per person.  · Your genetic counselor can provide you with more information about cystic fibrosis carrier testing.    _____  Yes, I am interested in discussing carrier testing with a genetic counselor.    _____  No, I am not interested in CF carrier testing or in receiving more information about CF carrier testing.      Client signature: ________________________________________  8/15/2019

## 2019-08-15 NOTE — PROGRESS NOTES
"Subjective:      S:  Yamini Carmona is a 19 y.o.  female  @ EGA: 10w3d ABEL: Estimated Date of Delivery: 3/9/20  per LMP who presents for her new OB exam.  She has no complaints.  She was seen in ED on 19 for cramping. Desires AFP when appropriate.  Declines CF.  Reports no FM, VB, LOF, or cramping.  Denies dysuria, vaginal DC.  Pt is  and lives with FOB and child. Works as homemker.  Pregnancy is unplanned but desired.  Desires Centering Pregnancy.    O:    Vitals:    08/15/19 1321   BP: 112/66   Weight: 118.8 kg (262 lb)   Height: 1.626 m (5' 4\")    See H&P Prenatal Physical.  Wet mount: not indicated        FHTs: 155        Fundal ht: 10cm     A:   1.  IUP @ 10w3d ABEL: Estimated Date of Delivery: 3/9/20 per LMP         2.  S=D        3.  There are no active problems to display for this patient.        P:  1.  GC/CT done. Pap done.         2.  Prenatal labs ordered - lab slip given        3.  Discussed PNV, diet, and adequate water intake        4.  NOB packet given        5.  Return to office in 4 wks        6.  Complete OB US in 9-10 wks        7.  Centering Pregnancy      HPI    Review of Systems   Constitutional: Negative.    HENT: Negative.    Eyes: Negative.    Respiratory: Negative.    Cardiovascular: Negative.    Gastrointestinal: Negative.    Genitourinary: Negative.         Uterus enlarged, c/w 10 wks gestation   Musculoskeletal: Negative.    Skin: Negative.    Neurological: Negative.    Endo/Heme/Allergies: Negative.    Psychiatric/Behavioral: Negative.           Objective:     /66   Ht 1.626 m (5' 4\")   Wt 118.8 kg (262 lb)   LMP 2019 (Exact Date)   BMI 44.97 kg/m²      Physical Exam            Assessment/Plan:     1. Encounter for supervision of other normal pregnancy in first trimester    - PREG CNTR PRENATAL PN; Future  - THINPREP RFLX HPV ASCUS W/CTNG; Future  - US-OB 2ND 3RD TRI COMPLETE; Future    "

## 2019-08-16 DIAGNOSIS — Z34.81 ENCOUNTER FOR SUPERVISION OF OTHER NORMAL PREGNANCY IN FIRST TRIMESTER: ICD-10-CM

## 2019-08-16 PROBLEM — Z28.39 RUBELLA NON-IMMUNE STATUS, ANTEPARTUM: Status: ACTIVE | Noted: 2019-08-16

## 2019-08-16 PROBLEM — O09.899 RUBELLA NON-IMMUNE STATUS, ANTEPARTUM: Status: ACTIVE | Noted: 2019-08-16

## 2019-08-19 LAB
C TRACH DNA GENITAL QL NAA+PROBE: NEGATIVE
CYTOLOGY REG CYTOL: NORMAL
N GONORRHOEA DNA GENITAL QL NAA+PROBE: NEGATIVE
SPECIMEN SOURCE: NORMAL

## 2019-10-24 ENCOUNTER — APPOINTMENT (OUTPATIENT)
Dept: RADIOLOGY | Facility: IMAGING CENTER | Age: 19
End: 2019-10-24
Attending: NURSE PRACTITIONER
Payer: COMMERCIAL

## 2019-10-24 DIAGNOSIS — Z34.81 ENCOUNTER FOR SUPERVISION OF OTHER NORMAL PREGNANCY IN FIRST TRIMESTER: ICD-10-CM

## 2019-10-24 PROCEDURE — 76805 OB US >/= 14 WKS SNGL FETUS: CPT | Performed by: OBSTETRICS & GYNECOLOGY

## 2019-11-20 ENCOUNTER — HOSPITAL ENCOUNTER (OUTPATIENT)
Dept: LAB | Facility: MEDICAL CENTER | Age: 19
End: 2019-11-20
Attending: NURSE PRACTITIONER
Payer: COMMERCIAL

## 2019-11-20 ENCOUNTER — ROUTINE PRENATAL (OUTPATIENT)
Dept: OBGYN | Facility: CLINIC | Age: 19
End: 2019-11-20
Payer: COMMERCIAL

## 2019-11-20 VITALS — BODY MASS INDEX: 44.63 KG/M2 | WEIGHT: 260 LBS | DIASTOLIC BLOOD PRESSURE: 70 MMHG | SYSTOLIC BLOOD PRESSURE: 138 MMHG

## 2019-11-20 DIAGNOSIS — Z34.82 ENCOUNTER FOR SUPERVISION OF OTHER NORMAL PREGNANCY, SECOND TRIMESTER: Primary | ICD-10-CM

## 2019-11-20 DIAGNOSIS — Z34.82 ENCOUNTER FOR SUPERVISION OF OTHER NORMAL PREGNANCY, SECOND TRIMESTER: ICD-10-CM

## 2019-11-20 LAB
GLUCOSE 1H P 50 G GLC PO SERPL-MCNC: 103 MG/DL (ref 70–139)
HCT VFR BLD AUTO: 39.6 % (ref 37–47)
HGB BLD-MCNC: 12.6 G/DL (ref 12–16)

## 2019-11-20 PROCEDURE — 86780 TREPONEMA PALLIDUM: CPT

## 2019-11-20 PROCEDURE — 82950 GLUCOSE TEST: CPT

## 2019-11-20 PROCEDURE — 85018 HEMOGLOBIN: CPT

## 2019-11-20 PROCEDURE — 85014 HEMATOCRIT: CPT

## 2019-11-20 PROCEDURE — 90040 PR PRENATAL FOLLOW UP: CPT | Performed by: NURSE PRACTITIONER

## 2019-11-20 PROCEDURE — 36415 COLL VENOUS BLD VENIPUNCTURE: CPT

## 2019-11-20 NOTE — PROGRESS NOTES
Pt here today for OB follow up  Pt states no complaints  Reports +FM  Good # [phone]  Pharmacy Confirmed.  Chaperone offered and declined.

## 2019-11-20 NOTE — PROGRESS NOTES
SUBJECTIVE:  Pt is a 19 y.o.   at 24w2d  gestation. Presents today for follow-up prenatal care. Reports no issues at this time.  Reports feeling good  fetal movement. Denies cramping/contractions, bleeding or leaking of fluid. Denies dysuria, headaches, N/V. Generally feels well today. Recent ER or L&D visits - none.     OBJECTIVE:  - See prenatal vitals flow  -   Vitals:    19 1433   BP: 138/70   Weight: 117.9 kg (260 lb)      Fundal Height - 25  FHT - 155  US normal  Prenatal labs normal, rubella equiv.               ASSESSMENT:   - IUP at 24w2d    - S=D   -   Patient Active Problem List    Diagnosis Date Noted   • Rubella equivocal  2019         PLAN:  - S/sx pregnancy and labor warning signs vs general discomforts discussed  - Fetal movements and kick counts reviewed   - Adequate hydration reinforced  - Nutrition/exercise/vitamin education; continued PNV  -  Lab slip for glucose testing given.

## 2019-11-21 LAB — TREPONEMA PALLIDUM IGG+IGM AB [PRESENCE] IN SERUM OR PLASMA BY IMMUNOASSAY: NON REACTIVE

## 2019-12-13 ENCOUNTER — HOSPITAL ENCOUNTER (OUTPATIENT)
Facility: MEDICAL CENTER | Age: 19
End: 2019-12-13
Attending: OBSTETRICS & GYNECOLOGY | Admitting: OBSTETRICS & GYNECOLOGY
Payer: COMMERCIAL

## 2019-12-13 ENCOUNTER — HOSPITAL ENCOUNTER (EMERGENCY)
Facility: MEDICAL CENTER | Age: 19
End: 2019-12-19
Payer: COMMERCIAL

## 2019-12-13 VITALS
BODY MASS INDEX: 43.32 KG/M2 | SYSTOLIC BLOOD PRESSURE: 118 MMHG | DIASTOLIC BLOOD PRESSURE: 58 MMHG | WEIGHT: 260 LBS | TEMPERATURE: 98.8 F | HEART RATE: 116 BPM | HEIGHT: 65 IN | RESPIRATION RATE: 18 BRPM

## 2019-12-13 LAB
ALBUMIN SERPL BCP-MCNC: 3.8 G/DL (ref 3.2–4.9)
ALBUMIN/GLOB SERPL: 1.1 G/DL
ALP SERPL-CCNC: 86 U/L (ref 30–99)
ALT SERPL-CCNC: 7 U/L (ref 2–50)
ANION GAP SERPL CALC-SCNC: 11 MMOL/L (ref 0–11.9)
APPEARANCE UR: ABNORMAL
AST SERPL-CCNC: 10 U/L (ref 12–45)
BASOPHILS # BLD AUTO: 0.2 % (ref 0–1.8)
BASOPHILS # BLD: 0.03 K/UL (ref 0–0.12)
BILIRUB SERPL-MCNC: 0.8 MG/DL (ref 0.1–1.5)
BUN SERPL-MCNC: 7 MG/DL (ref 8–22)
CALCIUM SERPL-MCNC: 9 MG/DL (ref 8.5–10.5)
CHLORIDE SERPL-SCNC: 105 MMOL/L (ref 96–112)
CO2 SERPL-SCNC: 22 MMOL/L (ref 20–33)
COLOR UR AUTO: ABNORMAL
CREAT SERPL-MCNC: 0.55 MG/DL (ref 0.5–1.4)
EOSINOPHIL # BLD AUTO: 0.02 K/UL (ref 0–0.51)
EOSINOPHIL NFR BLD: 0.1 % (ref 0–6.9)
ERYTHROCYTE [DISTWIDTH] IN BLOOD BY AUTOMATED COUNT: 43.9 FL (ref 35.9–50)
FLUAV RNA SPEC QL NAA+PROBE: NEGATIVE
FLUBV RNA SPEC QL NAA+PROBE: NEGATIVE
GLOBULIN SER CALC-MCNC: 3.4 G/DL (ref 1.9–3.5)
GLUCOSE SERPL-MCNC: 92 MG/DL (ref 65–99)
GLUCOSE UR QL STRIP.AUTO: NEGATIVE MG/DL
HCT VFR BLD AUTO: 39 % (ref 37–47)
HGB BLD-MCNC: 12.6 G/DL (ref 12–16)
IMM GRANULOCYTES # BLD AUTO: 0.05 K/UL (ref 0–0.11)
IMM GRANULOCYTES NFR BLD AUTO: 0.4 % (ref 0–0.9)
KETONES UR QL STRIP.AUTO: 80 MG/DL
LEUKOCYTE ESTERASE UR QL STRIP.AUTO: ABNORMAL
LYMPHOCYTES # BLD AUTO: 2.22 K/UL (ref 1–4.8)
LYMPHOCYTES NFR BLD: 16.4 % (ref 22–41)
MCH RBC QN AUTO: 27.4 PG (ref 27–33)
MCHC RBC AUTO-ENTMCNC: 32.3 G/DL (ref 33.6–35)
MCV RBC AUTO: 84.8 FL (ref 81.4–97.8)
MONOCYTES # BLD AUTO: 0.63 K/UL (ref 0–0.85)
MONOCYTES NFR BLD AUTO: 4.7 % (ref 0–13.4)
NEUTROPHILS # BLD AUTO: 10.56 K/UL (ref 2–7.15)
NEUTROPHILS NFR BLD: 78.2 % (ref 44–72)
NITRITE UR QL STRIP.AUTO: NEGATIVE
NRBC # BLD AUTO: 0 K/UL
NRBC BLD-RTO: 0 /100 WBC
PH UR STRIP.AUTO: 6 [PH] (ref 5–8)
PLATELET # BLD AUTO: 322 K/UL (ref 164–446)
PMV BLD AUTO: 10.8 FL (ref 9–12.9)
POTASSIUM SERPL-SCNC: 3.8 MMOL/L (ref 3.6–5.5)
PROT SERPL-MCNC: 7.2 G/DL (ref 6–8.2)
PROT UR QL STRIP: NEGATIVE MG/DL
RBC # BLD AUTO: 4.6 M/UL (ref 4.2–5.4)
RBC UR QL AUTO: NEGATIVE
SODIUM SERPL-SCNC: 138 MMOL/L (ref 135–145)
SP GR UR: 1.02 (ref 1–1.03)
WBC # BLD AUTO: 13.5 K/UL (ref 4.8–10.8)

## 2019-12-13 PROCEDURE — 87502 INFLUENZA DNA AMP PROBE: CPT

## 2019-12-13 PROCEDURE — 59025 FETAL NON-STRESS TEST: CPT | Mod: 26 | Performed by: OBSTETRICS & GYNECOLOGY

## 2019-12-13 PROCEDURE — 700105 HCHG RX REV CODE 258: Performed by: STUDENT IN AN ORGANIZED HEALTH CARE EDUCATION/TRAINING PROGRAM

## 2019-12-13 PROCEDURE — 59025 FETAL NON-STRESS TEST: CPT | Performed by: OBSTETRICS & GYNECOLOGY

## 2019-12-13 PROCEDURE — 81002 URINALYSIS NONAUTO W/O SCOPE: CPT

## 2019-12-13 PROCEDURE — 96374 THER/PROPH/DIAG INJ IV PUSH: CPT

## 2019-12-13 PROCEDURE — 85025 COMPLETE CBC W/AUTO DIFF WBC: CPT

## 2019-12-13 PROCEDURE — 700111 HCHG RX REV CODE 636 W/ 250 OVERRIDE (IP): Performed by: STUDENT IN AN ORGANIZED HEALTH CARE EDUCATION/TRAINING PROGRAM

## 2019-12-13 PROCEDURE — 80053 COMPREHEN METABOLIC PANEL: CPT

## 2019-12-13 RX ORDER — ONDANSETRON 2 MG/ML
4 INJECTION INTRAMUSCULAR; INTRAVENOUS EVERY 4 HOURS PRN
Status: DISCONTINUED | OUTPATIENT
Start: 2019-12-13 | End: 2019-12-14 | Stop reason: HOSPADM

## 2019-12-13 RX ORDER — SODIUM CHLORIDE, SODIUM LACTATE, POTASSIUM CHLORIDE, AND CALCIUM CHLORIDE .6; .31; .03; .02 G/100ML; G/100ML; G/100ML; G/100ML
1000 INJECTION, SOLUTION INTRAVENOUS ONCE
Status: COMPLETED | OUTPATIENT
Start: 2019-12-13 | End: 2019-12-13

## 2019-12-13 RX ADMIN — SODIUM CHLORIDE, POTASSIUM CHLORIDE, SODIUM LACTATE AND CALCIUM CHLORIDE 1000 ML: 600; 310; 30; 20 INJECTION, SOLUTION INTRAVENOUS at 20:35

## 2019-12-13 RX ADMIN — ONDANSETRON 4 MG: 2 INJECTION INTRAMUSCULAR; INTRAVENOUS at 21:08

## 2019-12-14 NOTE — PROGRESS NOTES
"[facility] TRIAGE PROGRESS NOTE    PATIENT ID:  NAME:  Yamini Carmona  MRN:               [id number]  YOB: 2000     19 y.o. female  at 27w4d.    Subjective: Pt is here for evaluation of nausea and vomiting with onset yesterday. No fevers but chills. She has had 20 episodes of diarrhea, yellow, non-bloody no mucous. She is feeling a little lightheaded as well. She has had 12 episodes of vomiting and has kept down abut half a water bottle today. No sick contacts.     She has some left-sided abd pain. She had [employer] last night and notes she and her boyfriend have not been feeling well.    Pregnancy uncomplicated     negative  For CTXS.   positive Feels pain   negative for LOF  negative for vaginal bleeding.   Little decrease in fetal movement    ROS: See HPI above    Objective:    Vitals:    19   BP: 118/58   Pulse: (!) 116   Resp: 18   Temp: 37.1 °C (98.8 °F)   TempSrc: Temporal   Weight: 117.9 kg (260 lb)   Height: 1.651 m (5' 5\")     Temp (24hrs), Av.1 °C (98.8 °F), Min:37.1 °C (98.8 °F), Max:37.1 °C (98.8 °F)    General: No acute distress, resting comfortably in bed.  HEENT: normocephalic, nontraumatic, EOMI  Cardiovascular: Heart RRR with no murmurs, rubs or gallops.  Respiratory: symmetric chest expansion, lungs CTAB, with no wheezes, rales, rhonci  Abdomen: gravid, nontender  Musculoskeletal: strength 5/5 in four extremities  Neuro: non focal with no numbness, tingling or changes in sensation    [place]: None  FHRM: Baseline 140, Accels to 160, no decels, moderate variability    Recent Labs     19   WBC 13.5*   RBC 4.60   HEMOGLOBIN 12.6   HEMATOCRIT 39.0   MCV 84.8   MCH 27.4   RDW 43.9   PLATELETCT 322   MPV 10.8   NEUTSPOLYS 78.20*   LYMPHOCYTES 16.40*   MONOCYTES 4.70   EOSINOPHILS 0.10   BASOPHILS 0.20     Recent Labs     19   SODIUM 138   POTASSIUM 3.8   CHLORIDE 105   CO2 22   GLUCOSE 92   BUN 7*         Assessment: 19 y.o. female "  at 27w4d. Nausea, vomiting and diarrhea with onset yesterday after eating [employer]. WBC shows mild WBC of 13.5, but electrolytes are wnl. Flu swab negative. Patient feels improved after a liter bolus of LR and Zofran.  Encouraged to stay well hydrated at home and may take Imodium as needed for diarrhea     Plan:   -  D/c home. Instructed that she may take Imodium as needed for diarrhea   - Plenty of fluids   - Tylenol for pain PRN  - Patient is cleared to return home with family. Encouraged to see MD/DO for increased painful uterine contractions @ 3-5, vaginal bleeding, loss of fluid, or other serious symptoms.    Discussed case with Dr. Ndiaye, C Attending. Case was discussed and attending agreed with plan prior to discharge of patient.    Scott Meza D.O.

## 2019-12-14 NOTE — PROGRESS NOTES
Yamini Carmona, a  at 27w4d with an ABEL of 3/9/2020, by Ultrasound, was seen at [facility] [place] for nausea, vomiting, and diarrhea who had a nonstress test.    Nonstress Test  Reason for NST: Other (Comment)(nausea and vomiting in pregnancy)  Variability: Moderate  Decelerations: None  Accelerations: Yes(10x10)  Acoustic Stimulator: No  Baseline: 140  Uterine Irritability: No  Contractions: Not present  Nonstress Test Interpretation  $ Nonstress Test Interpretation - Fetus A: Reactive  NST Interpreted By: kenn  Comments: as per my read, reactive nst

## 2019-12-14 NOTE — DISCHARGE INSTRUCTIONS
General Instructions:  · If you think you are in labor, time contractions (lying on your left side) from the beginning of one contraction to the beginning of the next contraction for at least one hour.  · Increase fluid intake: you should consume 10-12 8 oz glasses of non-caffeinated fluid per day.  · Report any pressure or burning on urination to your physician.  · Monitor fetal movement: If you notice an absence or decrease in fetal movement, drink a large glass of water and rest on your side.  If there is no increase in movement, call your physician or go to the hospital for further evaluation.  · Report any sudden, sharp abdominal pain.  · Report any bleeding.  Spotting or pinkish discharge is normal after vaginal exam.  You may also spot after sexual intercourse.    Pre-term Labor (<37 weeks):  Call your physician or return to the hospital if:  · You have painless regular contractions more than 4 in one hour.  · Your water breaks (remember time and color).  · You have menstrual-like cramps, a low dull backache or pressure in your pelvis or back.  · Your baby does not move enough to complete the daily kick count (10 movements in 2 hours).  · Your baby moves much less often than on the days before or you have not felt your baby move all day.  · Please review the MEDICATION LIST section of your AFTER VISIT SUMMARY document.  · Take your medication as prescribed      Other Instructions:  Small frequent bland meals, keep yourself hydrated. May take OTC immodium for diarrhea, and tylenol for discomfort.   Please carefully review your entire AFTER VISIT SUMMARY document for all discharge instructions.

## 2019-12-14 NOTE — PROGRESS NOTES
- pt is a , 27.4 weeks gestation IUP, with c/o N/V/D since yesterday and DFM for last 2 hours. No c/o uc's, lof, or bleeding. efm and toco placed, vss, afebrile. Pt states she has not been around anyone else sick and has not been able to keep anything down but water since yesterday.  - report given to Dr. Meza  - received orders for lab draws,  iv hydration, LR x1, zofran 4mg ivp once, flu swab.   -iv started, 18g right hand, labs drawn, LR infusing. zofran given (see mar).  - juice and crackers given for tolerance  - Dr. Meza and Dr. Ndiaye at nurses station, updated on lab results and pt tolerance on po, Dr. Ndiaye reviewed FHT strip, received orders to discharge home. Discussed poc with pt, IV removed, catheter intact, monitors removed, left to change clothes.  - addressed discharge instructions, all questions answered. Left off floor stable.

## 2019-12-18 ENCOUNTER — ROUTINE PRENATAL (OUTPATIENT)
Dept: OBGYN | Facility: CLINIC | Age: 19
End: 2019-12-18
Payer: COMMERCIAL

## 2019-12-18 VITALS — SYSTOLIC BLOOD PRESSURE: 108 MMHG | DIASTOLIC BLOOD PRESSURE: 58 MMHG | BODY MASS INDEX: 43.77 KG/M2 | WEIGHT: 263 LBS

## 2019-12-18 DIAGNOSIS — Z34.80 SUPERVISION OF OTHER NORMAL PREGNANCY: ICD-10-CM

## 2019-12-18 PROCEDURE — 90471 IMMUNIZATION ADMIN: CPT | Performed by: NURSE PRACTITIONER

## 2019-12-18 PROCEDURE — 90040 PR PRENATAL FOLLOW UP: CPT | Performed by: NURSE PRACTITIONER

## 2019-12-18 PROCEDURE — 90686 IIV4 VACC NO PRSV 0.5 ML IM: CPT | Performed by: NURSE PRACTITIONER

## 2019-12-18 PROCEDURE — 90715 TDAP VACCINE 7 YRS/> IM: CPT | Performed by: NURSE PRACTITIONER

## 2019-12-18 PROCEDURE — 90472 IMMUNIZATION ADMIN EACH ADD: CPT | Performed by: NURSE PRACTITIONER

## 2019-12-18 NOTE — PROGRESS NOTES
OB follow up   + fetal movement.  No VB, LOF or UC's.  Wt:263       BP: 108/58  Phone # [phone]  Preferred pharmacy confirmed.  Tdap given today  Flu vaccine given today.  Kick count sheet given today.      Flu vaccine given. Right Deltoid. Screening checklist reviewed with patient. VIS given. Verified by MS.    Tdap vaccine given. Left Deltoid. Screening checklist reviewed with patient. VIS given. Verified by MSRosalia

## 2019-12-18 NOTE — PROGRESS NOTES
SUBJECTIVE:  Pt is a 19 y.o.   at 28w2d  gestation. Presents today for follow-up prenatal care. Reports no issues at this time.  Reports fetal movement. Denies regular cramping/contractions, bleeding or leaking of fluid. Denies dysuria, headaches, N/V. Generally feels well today.      OBJECTIVE:  - See prenatal vitals flow  -   Vitals:    19 1009   BP: 108/58   Weight: 119.3 kg (263 lb)                 ASSESSMENT:   - IUP at 28w2d    - S=D   -   Patient Active Problem List    Diagnosis Date Noted   • Supervision of other normal pregnancy 2019   • Rubella equivocal  2019         PLAN:  - S/sx pregnancy and labor warning signs vs general discomforts discussed  - Fetal movements and/or kick counts reviewed   - Adequate hydration reinforced  - Nutrition/exercise/vitamin education; continue PNV  - Encouraged tour of LnD/childbirth education classes: contact info provided   - S/p Tdap vacc today   - S/p Flu vacc today   - Anticipatory guidance given  - RTC in 2 weeks for follow-up prenatal care/keep eye on fundal height as S>D this appt

## 2019-12-18 NOTE — LETTER
"Count Your Baby's Movements  Another step to a healthy delivery    Yamini Carmona             Dept: [phone]    How Many Weeks Pregnant 28w2d    Date to Begin Countin2019              How to use this chart    One way for your physician to keep track of your baby's health is by knowing how often the baby moves (or \"kicks\") in your womb.  You can help your physician to do this by using this chart every day.    Every day, you should see how many hours it takes for your baby to move 10 times.  Start in the morning, as soon as you get up.    · First, write down the time your baby moves until you get to 10.  · Check off one box every time your baby moves until you get to 10.  · Write down the time you finished counting in the last column.  · Total how long it took to count up all 10 movements.  · Finally, fill in the box that shows how long this took.  After counting 10 movements, you no longer have to count any more that day.  The next morning, just start counting again as soon as you get up.    What should you call a \"movement\"?  It is hard to say, because it will feel different from one mother to another and from one pregnancy to the next.  The important thing is that you count the movements the same way throughout your pregnancy.  If you have more questions, you should ask your physician.    Count carefully every day!  SAMPLE:  Week 28    How many hours did it take to feel 10 movements?       Start  Time     1     2     3     4     5     6     7     8     9     10   Finish Time   Mon 8:20 ·  ·  ·  ·  ·  ·  ·  ·  ·  ·  11:40                  Sat               Sun                 IMPORTANT: You should contact your physician if it takes more than two hours for you to feel 10 movements.  Each morning, write down the time and start to count the movements of your baby.  Keep track by checking off one box every time you feel one movement.  When you have " "felt 10 \"kicks\", write down the time you finished counting in the last column.  Then fill in the   box (over the check marissa) for the number of hours it took.  Be sure to read the complete instructions on the previous page.            "

## 2020-01-02 ENCOUNTER — ROUTINE PRENATAL (OUTPATIENT)
Dept: OBGYN | Facility: CLINIC | Age: 20
End: 2020-01-02
Payer: COMMERCIAL

## 2020-01-02 VITALS — DIASTOLIC BLOOD PRESSURE: 64 MMHG | WEIGHT: 265 LBS | BODY MASS INDEX: 44.1 KG/M2 | SYSTOLIC BLOOD PRESSURE: 106 MMHG

## 2020-01-02 DIAGNOSIS — O26.843 SIZE OF FETUS INCONSISTENT WITH DATES IN THIRD TRIMESTER: ICD-10-CM

## 2020-01-02 PROCEDURE — 90040 PR PRENATAL FOLLOW UP: CPT | Performed by: NURSE PRACTITIONER

## 2020-01-02 NOTE — PROGRESS NOTES
Pt here today for OB follow up  Pt states no complaints   Reports +FM  Good # [phone]  Pharmacy Confirmed.  Chaperone offered and declined.

## 2020-01-02 NOTE — PROGRESS NOTES
SUBJECTIVE:  Pt is a 19 y.o.   at 30w3d  gestation. Presents today for follow-up prenatal care. Reports no issues at this time.  Reports good fetal movement. Denies regular cramping/contractions, bleeding or leaking of fluid. Denies dysuria, headaches, N/V. Generally feels well today.    OBJECTIVE:  - See prenatal vitals flow  -   Vitals:    20 0849   BP: 106/64   Weight: 120.2 kg (265 lb)                 ASSESSMENT:   - IUP at 30w3d    - S>D  Patient Active Problem List    Diagnosis Date Noted   • Supervision of other normal pregnancy 2019   • Rubella equivocal  2019         PLAN:  - S/sx pregnancy and labor warning signs vs general discomforts discussed  - Fetal movements and/or kick counts reviewed   - Adequate hydration reinforced  - Nutrition/exercise/vitamin education; continue PNV  - OB US for growth   - S/p Tdap vacc   - S/p Flu vacc   - Anticipatory guidance given  - RTC in 2 weeks for follow-up prenatal care